# Patient Record
Sex: FEMALE | Race: BLACK OR AFRICAN AMERICAN | NOT HISPANIC OR LATINO | Employment: UNEMPLOYED | URBAN - METROPOLITAN AREA
[De-identification: names, ages, dates, MRNs, and addresses within clinical notes are randomized per-mention and may not be internally consistent; named-entity substitution may affect disease eponyms.]

---

## 2019-09-09 PROBLEM — Z00.00 ANNUAL PHYSICAL EXAM: Status: ACTIVE | Noted: 2019-09-09

## 2019-09-18 PROBLEM — E53.8 BIOTIN DEFICIENCY DISEASE: Status: ACTIVE | Noted: 2019-09-18

## 2019-09-18 PROBLEM — D75.89 MACROCYTOSIS: Status: ACTIVE | Noted: 2019-09-18

## 2019-09-18 PROBLEM — Z00.00 ANNUAL PHYSICAL EXAM: Status: RESOLVED | Noted: 2019-09-09 | Resolved: 2019-09-18

## 2019-09-18 PROBLEM — E55.9 VITAMIN D DEFICIENCY: Status: ACTIVE | Noted: 2019-09-18

## 2019-09-18 PROBLEM — E53.8 B12 DEFICIENCY: Status: ACTIVE | Noted: 2019-09-18

## 2023-07-07 ENCOUNTER — OFFICE VISIT (OUTPATIENT)
Dept: URGENT CARE | Facility: CLINIC | Age: 33
End: 2023-07-07
Payer: MEDICAID

## 2023-07-07 VITALS
HEIGHT: 64 IN | OXYGEN SATURATION: 99 % | WEIGHT: 127 LBS | SYSTOLIC BLOOD PRESSURE: 118 MMHG | TEMPERATURE: 98 F | BODY MASS INDEX: 21.68 KG/M2 | HEART RATE: 70 BPM | DIASTOLIC BLOOD PRESSURE: 82 MMHG

## 2023-07-07 DIAGNOSIS — R10.2 PELVIC CRAMPING: ICD-10-CM

## 2023-07-07 DIAGNOSIS — N89.8 VAGINAL DISCHARGE: Primary | ICD-10-CM

## 2023-07-07 LAB
B-HCG UR QL: NEGATIVE
BILIRUB UR QL STRIP: NEGATIVE
CTP QC/QA: YES
GLUCOSE UR QL STRIP: NEGATIVE
KETONES UR QL STRIP: NEGATIVE
LEUKOCYTE ESTERASE UR QL STRIP: NEGATIVE
PH, POC UA: 7
POC BLOOD, URINE: NEGATIVE
POC NITRATES, URINE: NEGATIVE
PROT UR QL STRIP: NEGATIVE
SP GR UR STRIP: 1.01 (ref 1–1.03)
UROBILINOGEN UR STRIP-ACNC: 1 (ref 0.1–1.1)

## 2023-07-07 PROCEDURE — 81025 POCT URINE PREGNANCY: ICD-10-PCS | Mod: S$GLB,,, | Performed by: NURSE PRACTITIONER

## 2023-07-07 PROCEDURE — 87661 TRICHOMONAS VAGINALIS AMPLIF: CPT | Performed by: NURSE PRACTITIONER

## 2023-07-07 PROCEDURE — 81025 URINE PREGNANCY TEST: CPT | Mod: S$GLB,,, | Performed by: NURSE PRACTITIONER

## 2023-07-07 PROCEDURE — 81003 POCT URINALYSIS, DIPSTICK, AUTOMATED, W/O SCOPE: ICD-10-PCS | Mod: QW,S$GLB,, | Performed by: NURSE PRACTITIONER

## 2023-07-07 PROCEDURE — 81003 URINALYSIS AUTO W/O SCOPE: CPT | Mod: QW,S$GLB,, | Performed by: NURSE PRACTITIONER

## 2023-07-07 PROCEDURE — 99203 PR OFFICE/OUTPT VISIT, NEW, LEVL III, 30-44 MIN: ICD-10-PCS | Mod: S$GLB,,, | Performed by: NURSE PRACTITIONER

## 2023-07-07 PROCEDURE — 87591 N.GONORRHOEAE DNA AMP PROB: CPT | Performed by: NURSE PRACTITIONER

## 2023-07-07 PROCEDURE — 99203 OFFICE O/P NEW LOW 30 MIN: CPT | Mod: S$GLB,,, | Performed by: NURSE PRACTITIONER

## 2023-07-07 NOTE — PROGRESS NOTES
"Subjective:       Patient ID: Pérez Lomax is a 33 y.o. female.    Vitals:  height is 5' 4" (1.626 m) and weight is 57.6 kg (127 lb). Her oral temperature is 98.2 °F (36.8 °C). Her blood pressure is 118/82 and her pulse is 70. Her oxygen saturation is 99%.     Chief Complaint: abnormal vaginal discharge (X 2 days with a thick green vaginal discharge, light cramping)    This is a 33 y.o. female who presents today with a chief complaint ofabnormal vaginal discharge: X 2 days with a thick green vaginal discharge, light cramping  Patient presents with:  abnormal vaginal discharge: X 2 days with a thick green vaginal discharge, light cramping         Genitourinary:  Positive for vaginal discharge.         Objective:      Physical Exam   Constitutional: She is oriented to person, place, and time. normal  HENT:   Head: Normocephalic and atraumatic.   Nose: Nose normal.   Mouth/Throat: Mucous membranes are moist. Oropharynx is clear.   Eyes: Conjunctivae are normal. Extraocular movement intact   Neck: Neck supple.   Cardiovascular: Normal rate, regular rhythm, normal heart sounds and normal pulses.   Pulmonary/Chest: Effort normal and breath sounds normal.   Abdominal: Normal appearance.   Musculoskeletal: Normal range of motion.         General: Normal range of motion.   Neurological: She is alert and oriented to person, place, and time.   Skin: Skin is warm and dry.   Psychiatric: Her behavior is normal. Mood normal.   Vitals reviewed.      Past medical history and current medications reviewed.       Assessment:           1. Vaginal discharge    2. Pelvic cramping              Plan:     1. STD panel.    2. Follow up with OBGYN on Monday 07/10/23    Vaginal discharge  -     POCT Urinalysis, Dipstick, Automated, W/O Scope  -     POCT urine pregnancy  -     C. trachomatis/N. gonorrhoeae by AMP DNA [facility]; Urine  -     Trichomonas vaginalis, RNA, Qual, Urine    Pelvic cramping  -     POCT Urinalysis, Dipstick, " Automated, W/O Scope  -     POCT urine pregnancy             There are no Patient Instructions on file for this visit.           Medical Decision Making:   Differential Diagnosis:   Chlamydia  Trichomonas

## 2023-07-09 LAB
C TRACH DNA SPEC QL NAA+PROBE: NOT DETECTED
N GONORRHOEA DNA SPEC QL NAA+PROBE: NOT DETECTED

## 2023-07-10 ENCOUNTER — TELEPHONE (OUTPATIENT)
Dept: URGENT CARE | Facility: CLINIC | Age: 33
End: 2023-07-10
Payer: MEDICAID

## 2023-07-10 NOTE — TELEPHONE ENCOUNTER
Patient called requested lab results. Verified identity. Results given. Made patient aware the trich test was still in the process of being ran with the lab, and as soon as we had the results either the provider or someone in office would be calling her. Patient stated understanding.